# Patient Record
Sex: MALE | Race: OTHER | NOT HISPANIC OR LATINO | URBAN - METROPOLITAN AREA
[De-identification: names, ages, dates, MRNs, and addresses within clinical notes are randomized per-mention and may not be internally consistent; named-entity substitution may affect disease eponyms.]

---

## 2023-09-13 ENCOUNTER — EMERGENCY (EMERGENCY)
Facility: HOSPITAL | Age: 28
LOS: 0 days | Discharge: HOME | End: 2023-09-14
Attending: EMERGENCY MEDICINE
Payer: SELF-PAY

## 2023-09-13 VITALS
SYSTOLIC BLOOD PRESSURE: 118 MMHG | WEIGHT: 149.91 LBS | OXYGEN SATURATION: 99 % | RESPIRATION RATE: 18 BRPM | HEART RATE: 100 BPM | DIASTOLIC BLOOD PRESSURE: 70 MMHG

## 2023-09-13 DIAGNOSIS — S43.004A UNSPECIFIED DISLOCATION OF RIGHT SHOULDER JOINT, INITIAL ENCOUNTER: ICD-10-CM

## 2023-09-13 DIAGNOSIS — M25.511 PAIN IN RIGHT SHOULDER: ICD-10-CM

## 2023-09-13 DIAGNOSIS — Z91.013 ALLERGY TO SEAFOOD: ICD-10-CM
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
## 2023-09-13 PROCEDURE — 23650 CLTX SHO DSLC W/MNPJ WO ANES: CPT | Mod: RT

## 2023-09-13 PROCEDURE — 73030 X-RAY EXAM OF SHOULDER: CPT | Mod: 26,RT

## 2023-09-13 PROCEDURE — 23545 CLTX ACROMCLAV DISLC W/MNPJ: CPT | Mod: 54,RT

## 2023-09-13 PROCEDURE — 73030 X-RAY EXAM OF SHOULDER: CPT | Mod: RT

## 2023-09-13 PROCEDURE — 99283 EMERGENCY DEPT VISIT LOW MDM: CPT | Mod: 25

## 2023-09-13 PROCEDURE — 99284 EMERGENCY DEPT VISIT MOD MDM: CPT | Mod: 57

## 2023-09-13 PROCEDURE — 99053 MED SERV 10PM-8AM 24 HR FAC: CPT

## 2023-09-13 RX ORDER — MORPHINE SULFATE 50 MG/1
4 CAPSULE, EXTENDED RELEASE ORAL ONCE
Refills: 0 | Status: DISCONTINUED | OUTPATIENT
Start: 2023-09-13 | End: 2023-09-13

## 2023-09-13 NOTE — ED PROVIDER NOTE - CLINICAL SUMMARY MEDICAL DECISION MAKING FREE TEXT BOX
Shoulder reduced with external rotation. Patient still subjectively feels laxity when crossing the midline but XR demonstrates complete reduction. Patient is NV intact after reduction, was placed in sling and provided with rapid ortho follow up.     Advised on sling use, sx monitoring, return precautions, follow up.

## 2023-09-13 NOTE — ED ADULT NURSE NOTE - NSFALLUNIVINTERV_ED_ALL_ED
Bed/Stretcher in lowest position, wheels locked, appropriate side rails in place/Call bell, personal items and telephone in reach/Instruct patient to call for assistance before getting out of bed/chair/stretcher/Non-slip footwear applied when patient is off stretcher/[place] to call system/Physically safe environment - no spills, clutter or unnecessary equipment/Purposeful proactive rounding/Room/bathroom lighting operational, light cord in reach

## 2023-09-13 NOTE — ED PROVIDER NOTE - CARE PROVIDER_API CALL
Jerry Ribera  Orthopaedic Surgery  [address] [place] [address]  Phone: [phone]  Fax: [phone]  Follow Up Time:

## 2023-09-13 NOTE — ED PROVIDER NOTE - OBJECTIVE STATEMENT
Healthy 27 yo M here for assessment of R shoulder pain -- patient was leaning with his hand on his bed, turned and felt a pop, had immediate pain to R shoulder. No change in sensation. Pain worse with any movement at shoulder, improves somewhat with holding shoulder adducted and elbow flexed against body.   No numbness, tingling.    Has 1 previous dislocation in high school.

## 2023-09-13 NOTE — ED PROVIDER NOTE - NSFOLLOWUPINSTRUCTIONS_ED_ALL_ED_FT
Follow up with your primary care doctor and your orthopedic in 1-2 days    Our Emergency Department Referral Coordinators will be reaching out to you in the next 24-48 hours from 9:00am to 5:00pm with a follow up appointment. Please expect a phone call from the hospital in that time frame. If you do not receive a call or if you have any questions or concerns, you can reach them at [phone]      SHOULDER DISLOCATION - [facility](R) Instructions(ER/ED)     Shoulder Dislocation    WHAT YOU NEED TO KNOW:    A shoulder dislocation occurs when the top of your arm bone (humerus) moves out of the socket in your shoulder blade.Shoulder Anatomy         DISCHARGE INSTRUCTIONS:    Return to the emergency department if:     Your injured shoulder and arm become pale or cold.      You cannot move your injured shoulder and arm.      You have increased redness or swelling in your injured shoulder.      Your shoulder becomes dislocated again.    Contact your healthcare provider if:     You have a fever.      You have increased pain in your injured shoulder and arm even after you rest and take your medicine.      You have new weakness or numbness in your injured shoulder and arm.      You have questions or concerns about your condition or care.    Medicines:You may need any of the following:     Prescription pain medicine may be given. Ask your healthcare provider how to take this medicine safely. Some prescription pain medicines contain acetaminophen. Do not take other medicines that contain acetaminophen without talking to your healthcare provider. Too much acetaminophen may cause liver damage. Prescription pain medicine may cause constipation. Ask your healthcare provider how to prevent or treat constipation.       A muscle relaxer may help the tight muscles in your shoulder relax.      Take your medicine as directed. Contact your healthcare provider if you think your medicine is not helping or if you have side effects. Tell him or her if you are allergic to any medicine. Keep a list of the medicines, vitamins, and herbs you take. Include the amounts, and when and why you take them. Bring the list or the pill bottles to follow-up visits. Carry your medicine list with you in case of an emergency.    Rest your injured shoulder and arm: Rest helps your muscles and tissues heal. Avoid activities that cause pain or use an overhead arm motion. Your healthcare provider will tell you when it is safe to return to sports or other daily activities.    How to wear a brace, sling, or splint: A brace, sling, or splint may be needed to limit your movement and protect your injured shoulder.Shoulder Sling         Wear your brace, sling, or splint all the time. Take it off only to bathe or do exercises as directed. Ask your healthcare provider how many weeks you should wear it.      Keep your skin clean and dry. Place padding under your armpit to help absorb sweat and prevent sores on your skin.      Do not hunch your shoulders. This may cause pain. Keep your shoulders relaxed.      Support your wrist and hand with the sling. Cover the knuckles on your hand with your sling. Your wrist should be positioned higher than your elbow. Your wrist may start to hurt or go numb if your sling is too short.    Apply ice: Ice helps decrease swelling and pain. Ice may also help prevent tissue damage. Use an ice pack, or put crushed ice in a plastic bag. Cover it with a towel before you place it on your shoulder. Apply ice for 15 to 20 minutes every hour or as directed.    Exercises: Begin gentle exercises as directed. After healing begins, you may start light exercises so your shoulder does not get stiff. Go to physical therapy if directed. A physical therapist teaches you exercises to help improve movement and strength, and to decrease pain. Do not lift heavy objects or do any exercise that causes severe pain.    Follow up with your healthcare provider in 5 to 10 days: Write down your questions so you remember to ask them during your visits.

## 2023-09-13 NOTE — ED PROVIDER NOTE - PATIENT PORTAL LINK FT
You can access the FollowMyHealth Patient Portal offered by [facility] by registering at the following website: [email or web address]. By joining [facility]’s FollowMyHealth portal, you will also be able to view your health information using other applications (apps) compatible with our system.

## 2023-09-13 NOTE — ED PROVIDER NOTE - PHYSICAL EXAMINATION
VITAL SIGNS: I have reviewed nursing notes and confirm.  CONSTITUTIONAL: Well-developed; well-nourished; in no acute distress, in pain  SKIN: Skin exam is warm and dry, no acute rash.  HEAD: Normocephalic; atraumatic.  EYES: PERRL, EOM intact; conjunctiva and sclera clear.  ENT: No nasal discharge; airway clear.   CARD: S1, S2 normal; no murmurs, gallops, or rubs. Regular rate and rhythm.  RESP: No wheezes, rales or rhonchi.  ABD: Normal bowel sounds; soft; non-distended; non-tender; no hepatosplenomegaly.  EXT: R arm held in adduction at shoulder, flexion at elbow, against the body, has pain with any ROM at shoulder, good distal pulses, sensation intact over deltoid, has squaring off of R shoulder consistent with dislocation  NEURO: Alert, oriented. Grossly unremarkable. No focal deficits.  PSYCH: Cooperative, appropriate.